# Patient Record
Sex: MALE | Race: WHITE | NOT HISPANIC OR LATINO | Employment: OTHER | URBAN - METROPOLITAN AREA
[De-identification: names, ages, dates, MRNs, and addresses within clinical notes are randomized per-mention and may not be internally consistent; named-entity substitution may affect disease eponyms.]

---

## 2017-10-22 ENCOUNTER — HOSPITAL ENCOUNTER (EMERGENCY)
Facility: OTHER | Age: 52
Discharge: HOME | End: 2017-10-22
Attending: EMERGENCY MEDICINE

## 2017-10-22 VITALS
RESPIRATION RATE: 18 BRPM | OXYGEN SATURATION: 92 % | WEIGHT: 190 LBS | TEMPERATURE: 98 F | SYSTOLIC BLOOD PRESSURE: 133 MMHG | HEIGHT: 70 IN | HEART RATE: 98 BPM | DIASTOLIC BLOOD PRESSURE: 97 MMHG | BODY MASS INDEX: 27.2 KG/M2

## 2017-10-22 DIAGNOSIS — J06.9 VIRAL URI WITH COUGH: Primary | ICD-10-CM

## 2017-10-22 DIAGNOSIS — R05.9 COUGH: ICD-10-CM

## 2017-10-22 DIAGNOSIS — R03.0 ELEVATED BLOOD PRESSURE READING: ICD-10-CM

## 2017-10-22 PROCEDURE — 25000242 PHARM REV CODE 250 ALT 637 W/ HCPCS: Performed by: PHYSICIAN ASSISTANT

## 2017-10-22 PROCEDURE — 94640 AIRWAY INHALATION TREATMENT: CPT

## 2017-10-22 PROCEDURE — 99284 EMERGENCY DEPT VISIT MOD MDM: CPT | Mod: 25

## 2017-10-22 PROCEDURE — 25000242 PHARM REV CODE 250 ALT 637 W/ HCPCS: Performed by: EMERGENCY MEDICINE

## 2017-10-22 PROCEDURE — 94761 N-INVAS EAR/PLS OXIMETRY MLT: CPT

## 2017-10-22 RX ORDER — IPRATROPIUM BROMIDE AND ALBUTEROL SULFATE 2.5; .5 MG/3ML; MG/3ML
3 SOLUTION RESPIRATORY (INHALATION)
Status: COMPLETED | OUTPATIENT
Start: 2017-10-22 | End: 2017-10-22

## 2017-10-22 RX ORDER — DEXTROAMPHETAMINE SACCHARATE, AMPHETAMINE ASPARTATE MONOHYDRATE, DEXTROAMPHETAMINE SULFATE AND AMPHETAMINE SULFATE 7.5; 7.5; 7.5; 7.5 MG/1; MG/1; MG/1; MG/1
30 CAPSULE, EXTENDED RELEASE ORAL EVERY MORNING
COMMUNITY

## 2017-10-22 RX ORDER — ALBUTEROL SULFATE 90 UG/1
1-2 AEROSOL, METERED RESPIRATORY (INHALATION) EVERY 6 HOURS PRN
Qty: 1 INHALER | Refills: 0 | Status: SHIPPED | OUTPATIENT
Start: 2017-10-22 | End: 2018-10-22

## 2017-10-22 RX ORDER — CLONAZEPAM 1 MG/1
2 TABLET ORAL NIGHTLY PRN
COMMUNITY

## 2017-10-22 RX ORDER — ZOLPIDEM TARTRATE 10 MG/1
5 TABLET ORAL NIGHTLY PRN
COMMUNITY

## 2017-10-22 RX ORDER — BUPROPION HYDROCHLORIDE 300 MG/1
300 TABLET ORAL DAILY
COMMUNITY

## 2017-10-22 RX ORDER — BENZONATATE 100 MG/1
100 CAPSULE ORAL 3 TIMES DAILY PRN
Qty: 20 CAPSULE | Refills: 0 | Status: SHIPPED | OUTPATIENT
Start: 2017-10-22 | End: 2017-11-01

## 2017-10-22 RX ADMIN — IPRATROPIUM BROMIDE AND ALBUTEROL SULFATE 3 ML: .5; 3 SOLUTION RESPIRATORY (INHALATION) at 04:10

## 2017-10-22 RX ADMIN — IPRATROPIUM BROMIDE AND ALBUTEROL SULFATE 3 ML: .5; 3 SOLUTION RESPIRATORY (INHALATION) at 03:10
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
## 2017-10-22 NOTE — ED PROVIDER NOTES
Encounter Date: 10/22/2017       History     Chief Complaint   Patient presents with    Cough     pt with cough and bronchitis x several days . pt on z pack and predinisone x 5 days.     Patient is a 51-year-old female with no significant medical history who presents to the emergency department with cough.  Patient states over the last week he has had cough with no sputum production.  He states he was running fevers and had body aches.  He states he saw his PCP who treated him with a Z-Roshan and prednisone.  Patient states over the last 5 days his symptoms have progressively worsened.  He states the cough has worsened.  He denies sputum production still.  He denies chest pain or shortness of breath.  He denies lower extremity edema.  He does report history of smoking, but states he has never been diagnosed with COPD.  States he feels himself wheezing.      The history is provided by the patient.     Review of patient's allergies indicates:  No Known Allergies  Past Medical History:   Diagnosis Date    Anxiety      Past Surgical History:   Procedure Laterality Date    TONSILLECTOMY       History reviewed. No pertinent family history.  Social History   Substance Use Topics    Smoking status: Current Every Day Smoker     Packs/day: 1.00     Types: Cigarettes    Smokeless tobacco: Never Used    Alcohol use No     Review of Systems   Constitutional: Negative for activity change, appetite change, fatigue and fever.   HENT: Negative for congestion, ear discharge, ear pain, postnasal drip, rhinorrhea, sore throat and trouble swallowing.    Respiratory: Positive for cough and wheezing. Negative for shortness of breath.    Cardiovascular: Negative for chest pain, palpitations and leg swelling.   Gastrointestinal: Negative for abdominal pain, blood in stool, constipation, diarrhea, nausea and vomiting.   Genitourinary: Negative for dysuria, flank pain and hematuria.   Musculoskeletal: Negative for back pain, neck pain and  neck stiffness.   Skin: Negative for rash and wound.   Neurological: Negative for dizziness, syncope, speech difficulty, weakness, light-headedness, numbness and headaches.       Physical Exam     Initial Vitals [10/22/17 1409]   BP Pulse Resp Temp SpO2   (!) 172/103 60 18 97.9 °F (36.6 °C) 96 %      MAP       126         Physical Exam    Nursing note and vitals reviewed.  Constitutional: He appears well-developed and well-nourished. He is not diaphoretic.  Non-toxic appearance. No distress.   HENT:   Head: Normocephalic.   Right Ear: Hearing and external ear normal.   Left Ear: Hearing and external ear normal.   Nose: Nose normal.   Mouth/Throat: Uvula is midline, oropharynx is clear and moist and mucous membranes are normal. No trismus in the jaw. No uvula swelling. No oropharyngeal exudate.   Eyes: Conjunctivae are normal. Pupils are equal, round, and reactive to light.   Neck: Normal range of motion.   Cardiovascular: Normal rate and regular rhythm.   No lower extremity edema   Pulmonary/Chest: He has wheezes (scant expiratory wheezing in lower lung fields). He has no rhonchi. He has no rales. He exhibits no tenderness.   Abdominal: Soft. Bowel sounds are normal. He exhibits no distension and no mass. There is no tenderness. There is no rebound and no guarding.   Lymphadenopathy:     He has no cervical adenopathy.   Neurological: He is alert and oriented to person, place, and time.   Skin: Skin is warm and dry. Capillary refill takes less than 2 seconds.   Psychiatric: He has a normal mood and affect.         ED Course   Procedures  Labs Reviewed - No data to display           Imaging Results          X-Ray Chest PA And Lateral (Final result)  Result time 10/22/17 15:24:07    Final result by Raven Alfredo MD (10/22/17 15:24:07)                 Impression:     No acute cardiopulmonary process.          Electronically signed by: RAVEN ALFREDO MD  Date:     10/22/17  Time:    15:24              Narrative:     Chest PA and lateral    Indication:Cough    Comparison:None    Findings:  The cardiomediastinal silhouette is not enlarged.  There is no pleural effusion.  The trachea is midline.  The lungs are symmetrically expanded bilaterally without evidence of acute parenchymal process. No large focal consolidation seen.  There is no pneumothorax.  The osseous structures are remarkable for degenerative changes.                              Medical Decision Making:   Initial Assessment:   Urgent evaluation of a 51-year-old male with no significant medical history who presents to the emergency department with cough.  Patient is afebrile, nontoxic appearing, and hemodynamically stable.  Oxygen is 96% on room air.  No lower extremity edema.  On lung auscultation, there are scant exit toward he wheezes in the lower lung fields.  I suspect patient has viral URI.  With no shortness of breath, I doubt congestive heart failure, PE or ACS.  Chest x-ray will be obtained.  Patient will be given breathing treatments and reevaluated.  Clinical Tests:   Radiological Study: Ordered and Reviewed  ED Management:  3:33 PM  Chest x-ray reveals no acute cardiopulmonary process.  Patient be discharged with albuterol for wheezing.  He is counseled on importance of smoking cessation.  He will be given Tessalon for cough.  He is advised to follow-up with PCP in 24-48 hours for reevaluation.  He is advised to keep blood pressure journal to present to PCP.  He is advised to return to the emergency department with any worsening symptoms or concerns.    3:42 PM  Delayed discharge. Pt is satting at 92% on room air.  Will administer another breathing treatment.    5:11 PM  Patient has had 4 more breathing treatments.  He states he feels much better.  Patient is still slightly hypoxic at 94% on room air.  I gave patient option of being admitted to hospital.  He states he does not want a hospital bill.  He states he would rather follow-up outpatient.  I  suspect patient has developed COPD.  He is encouraged to stop smoking.  He will be discharged with Tessalon and albuterol.  He is currently on steroids and Z-Roshan from PCP.  He is given contact information to establish care with a PCP here in the area.  He is urged to return to the emergency department with any worsening symptoms.  Other:   I have discussed this case with another health care provider.       <> Summary of the Discussion: Dr. Slater                   ED Course      Clinical Impression:   The primary encounter diagnosis was Viral URI with cough. Diagnoses of Cough and Elevated blood pressure reading were also pertinent to this visit.                           Nathalia Bunch PA-C  10/22/17 1536       Nathalia Bunch PA-C  10/22/17 1542       Nathalia Bunch PA-C  10/22/17 1718

## 2017-10-22 NOTE — ED NOTES
Patient Identifiers for Justin Hickman checked and correct  LOC: The patient is awake, alert and aware of environment with an appropriate affect, the patient is oriented x 3 and speaking appropriate.  APPEARANCE: Patient resting comfortably and in no acute distress. The patient is clean and well groomed. The patient's clothing is properly fastened.  SKIN: The skin is warm and dry. The patient has normal skin turgor and moist mucus membranes. No rashes or lesions upon observation. Skin Intact , no breakdown noted.  Musculoskeletal :  Normal range of motion noted. Moves all extremities well, No swelling. Palpation tenderness noted in the mid thoracic spine & right side of neck.  RESPIRATORY: Airway is open and patent, respirations are spontaneous, patient has a normal effort and rate. Coarse inspiratory rales & wheezing auscultated in the left base, diffusely auscultated all other fields.  CARDIAC: Patient has a normal rate and rhythm, no peripheral edema noted, capillary refill < 3 seconds.   PULSES: 2+ radial pulses, symmetrical.  NEUROLOGIC: PERRL, Pupils 3 mm and reacts briskly to light. Motor strength 5/5 all extremities.  The pt's facial expression is symmetrical, patient moving all extremities, normal sensation in all extremities when touched with a finger.The patient is awake, alert and cooperative with a calm affect, patient is aware of environment.      Will continue to monitor

## 2019-10-29 ENCOUNTER — TELEPHONE (OUTPATIENT)
Dept: TRANSPLANT | Facility: CLINIC | Age: 54
End: 2019-10-29

## 2019-10-29 NOTE — TELEPHONE ENCOUNTER
----- Message from Joshua Houston sent at 10/29/2019  5:17 PM CDT -----    We have the pt records and they are now pending review by the referral nurse.  By:Joshua Houston

## 2019-11-01 ENCOUNTER — DOCUMENTATION ONLY (OUTPATIENT)
Dept: TRANSPLANT | Facility: CLINIC | Age: 54
End: 2019-11-01

## 2019-11-01 NOTE — PROGRESS NOTES
Pt records reviewed.  Pt will be referred to [facility] due to HEP C   Initial referral received from DR. Haider Simpson   Referral letter sent to provider and patient.

## 2019-11-01 NOTE — LETTER
November 1, 2019    Haider Simpson, JOSHUA  [address]  [address]  [place] [address]      Dear Dr. Simpson    Patient: Justin Hickman   MR Number: [id number]   YOB: 1965     Thank you for the referral of Justin Hickman to the [facility] program. An initial appointment will be scheduled for your patient with one of our Hepatologists.      Thank you again for your trust in our program.  If there is anything we can do for you or your staff, please feel free to contact us.        Sincerely,        [facility]  [address] [place] [address]  [phone]

## 2019-11-01 NOTE — LETTER
November 1, 2019    Justin Hickman  [address] [address]      Dear Justin Hickman:    Your doctor has referred you to the [facility]. We are sending this letter to remind you to make an appointment with us to complete the referral process.     Please call us at [phone] to schedule an appointment. We look forward to seeing you soon.     If you received a call and have been scheduled, please disregard this letter.       Sincerely,        [facility]   [address] [place] [address]  [phone]

## 2020-03-24 ENCOUNTER — OFFICE VISIT (OUTPATIENT)
Dept: URGENT CARE | Facility: CLINIC | Age: 55
End: 2020-03-24
Payer: MEDICAID

## 2020-03-24 VITALS
DIASTOLIC BLOOD PRESSURE: 98 MMHG | HEART RATE: 53 BPM | HEIGHT: 70 IN | WEIGHT: 230 LBS | RESPIRATION RATE: 18 BRPM | SYSTOLIC BLOOD PRESSURE: 153 MMHG | BODY MASS INDEX: 32.93 KG/M2 | OXYGEN SATURATION: 97 % | TEMPERATURE: 97 F

## 2020-03-24 DIAGNOSIS — S61.011A LACERATION OF RIGHT THUMB WITHOUT FOREIGN BODY WITHOUT DAMAGE TO NAIL, INITIAL ENCOUNTER: Primary | ICD-10-CM

## 2020-03-24 DIAGNOSIS — S69.91XA INJURY OF RIGHT THUMB, INITIAL ENCOUNTER: ICD-10-CM

## 2020-03-24 PROCEDURE — 90471 TD VACCINE GREATER THAN OR EQUAL TO 7YO WITH PRESERVATIVE IM: ICD-10-PCS | Mod: S$GLB,,, | Performed by: NURSE PRACTITIONER

## 2020-03-24 PROCEDURE — 90714 TD VACC NO PRESV 7 YRS+ IM: CPT | Mod: S$GLB,,, | Performed by: NURSE PRACTITIONER

## 2020-03-24 PROCEDURE — 99203 PR OFFICE/OUTPT VISIT, NEW, LEVL III, 30-44 MIN: ICD-10-PCS | Mod: 25,S$GLB,, | Performed by: NURSE PRACTITIONER

## 2020-03-24 PROCEDURE — 90471 IMMUNIZATION ADMIN: CPT | Mod: S$GLB,,, | Performed by: NURSE PRACTITIONER

## 2020-03-24 PROCEDURE — 90714 TD VACCINE GREATER THAN OR EQUAL TO 7YO WITH PRESERVATIVE IM: ICD-10-PCS | Mod: S$GLB,,, | Performed by: NURSE PRACTITIONER

## 2020-03-24 PROCEDURE — 12041 INTMD RPR N-HF/GENIT 2.5CM/<: CPT | Mod: S$GLB,,, | Performed by: FAMILY MEDICINE

## 2020-03-24 PROCEDURE — 73140 XR FINGER 2 OR MORE VIEWS: ICD-10-PCS | Mod: FY,RT,S$GLB, | Performed by: RADIOLOGY

## 2020-03-24 PROCEDURE — 73140 X-RAY EXAM OF FINGER(S): CPT | Mod: FY,RT,S$GLB, | Performed by: RADIOLOGY

## 2020-03-24 PROCEDURE — 12041 LACERATION REPAIR: ICD-10-PCS | Mod: S$GLB,,, | Performed by: FAMILY MEDICINE

## 2020-03-24 PROCEDURE — 99203 OFFICE O/P NEW LOW 30 MIN: CPT | Mod: 25,S$GLB,, | Performed by: NURSE PRACTITIONER

## 2020-03-24 RX ORDER — DEXTROAMPHETAMINE SACCHARATE, AMPHETAMINE ASPARTATE, DEXTROAMPHETAMINE SULFATE AND AMPHETAMINE SULFATE 7.5; 7.5; 7.5; 7.5 MG/1; MG/1; MG/1; MG/1
30 TABLET ORAL
COMMUNITY
Start: 2019-11-25

## 2020-03-24 RX ORDER — CLONAZEPAM 2 MG/1
TABLET ORAL
COMMUNITY
Start: 2019-11-04

## 2020-03-24 RX ORDER — LITHIUM CARBONATE 450 MG/1
450 TABLET ORAL
COMMUNITY
Start: 2019-07-12

## 2020-03-24 RX ORDER — BUPRENORPHINE AND NALOXONE 8; 2 MG/1; MG/1
2 FILM, SOLUBLE BUCCAL; SUBLINGUAL
COMMUNITY
Start: 2018-08-07

## 2020-03-24 RX ORDER — LEVOTHYROXINE SODIUM 75 UG/1
75 TABLET ORAL
COMMUNITY
Start: 2020-01-17 | End: 2020-07-17

## 2020-03-24 RX ORDER — TRAZODONE HYDROCHLORIDE 100 MG/1
TABLET ORAL
COMMUNITY
Start: 2019-10-28

## 2020-03-25 NOTE — PROCEDURES
"Laceration Repair  Date/Time: 3/24/2020 7:05 PM  Performed by: Houston Evans MD  Authorized by: Sindy Lala NP   Consent Done: Yes  Consent: Verbal consent obtained.  Consent given by: patient  Patient identity confirmed:  and name  Time out: Immediately prior to procedure a "time out" was called to verify the correct patient, procedure, equipment, support staff and site/side marked as required.  Body area: upper extremity  Location details: right thumb  Laceration length: 1.5 cm  Foreign bodies: no foreign bodies  Vascular damage: no  Anesthesia: local infiltration    Anesthesia:  Local Anesthetic: lidocaine 1% without epinephrine  Preparation: Patient was prepped and draped in the usual sterile fashion.  Irrigation solution: saline  Irrigation method: syringe  Amount of cleaning: extensive  Debridement: none  Degree of undermining: none  Skin closure: Ethilon (4)  Number of sutures: 4  Technique: simple  Approximation: close  Approximation difficulty: complex  Dressing: dressing applied  Patient tolerance: Patient tolerated the procedure well with no immediate complications        "

## 2020-03-25 NOTE — PATIENT INSTRUCTIONS
PLEASE READ YOUR DISCHARGE INSTRUCTIONS ENTIRELY AS IT CONTAINS IMPORTANT INFORMATION.    Use the antibiotic ointment for 5 days then stop and allow to scab over    Do not wet laceration for 12 hours.  Do not submerge laceration in water.  Gently clean wound with soap and water at least twice daily unless more frequently soiled then clean more frequently.  May apply Bactroban to wound to promote healing.  Must clean old antibiotic ointment away before new application.  DO NOT REMOVE SUTURES YOURSELF.  Return to clinic for suture removal as directed.   Signs of infection:  Increase in redness, increase in pain, purulent (pus) drainage. Contact clinic if infection concerns arise. Do not apply a great deal of tension or stress to the suture line.    Return in 7-10 days to have the sutures removed.     Please return or see your primary care doctor if you develop new or worsening symptoms (fever, spreading redness, pus drainage, severe pain or swelling).     Please arrange follow up with your primary medical clinic as soon as possible. You must understand that you've received an Urgent Care treatment only and that you may be released before all of your medical problems are known or treated. You, the patient, will arrange for follow up as instructed. If your symptoms worsen or fail to improve you should go to the Emergency Room.  Extremity Laceration: Sutures, Staples, or Tape  A laceration is a cut through the skin. If it is deep, it may require stitches (sutures) or staples to close so it can heal. Minor cuts may be treated with surgical tape closures.   X-rays may be done if something may have entered the skin through the cut. You may also need a tetanus shot if you are not up to date on this vaccination.  Home care  · Follow the health care providers instructions on how to care for the cut.  · Wash your hands with soap and warm water before and after caring for your wound. This is to help prevent infection.  · Keep  the wound clean and dry. If a bandage was applied and it becomes wet or dirty, replace it. Otherwise, leave it in place for the first 24 hours, then change it once a day or as directed.  · If sutures or staples were used, clean the wound daily:  · After removing the bandage, wash the area with soap and water. Use a wet cotton swab to loosen and remove any blood or crust that forms.  · After cleaning, keep the wound clean and dry. Talk with your doctor before applying any antibiotic ointment to the wound. Reapply the bandage.  · You may remove the bandage to shower as usual after the first 24 hours, but do not soak the area in water (no swimming) until the stitches or staples are removed.  · If surgical tape closures were used, keep the area clean and dry. If it becomes wet, blot it dry with a towel.  · The doctor may prescribe an antibiotic cream or ointment to prevent infection. Do not stop taking this medication until you have finished the prescribed course or the doctor tells you to stop. The doctor may also prescribe medications for pain. Follow the doctors instructions for taking these medications.  · Avoid activities that may reopen your wound.  Follow-up care  Follow up with your health care provider. Most skin wounds heal within ten days. However, an infection may sometimes occur despite proper treatment. Therefore, check the wound daily for the signs of infection listed below. Stitches and staples should be removed within 7-14 days. If surgical tape closures were used, you may remove them after 10 days if they have not fallen off by then.   When to seek medical advice  Call your health care provider right away if any of these occur:  · Wound bleeding not controlled by direct pressure  · Signs of infection, including increasing pain in the wound, increasing wound redness or swelling, or pus or bad odor coming from the wound  · Fever of 100.4°F (38ºC) or higher or as directed by your healthcare  provider  · Stitches or staples come apart or fall out or surgical tape falls off before 7 days  · Wound edges re-open  · Wound changes colors  · Numbness around the wound   · Decreased movement around the injured area  Date Last Reviewed: 6/14/2015  © 7781-6957 [employer]. [address] [place] [address]. All rights reserved. This information is not intended as a substitute for professional medical care. Always follow your healthcare professional's instructions.

## 2020-03-25 NOTE — PROGRESS NOTES
"Subjective:       Patient ID: Justin Hickman is a 54 y.o. male.    Vitals:  height is 5' 10" (1.778 m) and weight is 104.3 kg (230 lb). His temperature is 97 °F (36.1 °C). His blood pressure is 153/98 (abnormal) and his pulse is 53 (abnormal). His respiration is 18 and oxygen saturation is 97%.     Chief Complaint: Hand Pain (Right Thumb )    Hand Pain    The incident occurred 1 to 3 hours ago. The incident occurred at work. The injury mechanism was a direct blow. The pain is present in the right fingers. Quality: Throbbing  The pain is at a severity of 5/10. The pain is moderate. The pain has been fluctuating since the incident. Associated symptoms include numbness and tingling. The symptoms are aggravated by movement and palpation. Treatments tried: Peroxide and Antibiotic Ointment  The treatment provided moderate relief.       Constitution: Negative for fatigue.   HENT: Negative for facial swelling and facial trauma.    Neck: Negative for neck stiffness.   Cardiovascular: Negative for chest trauma.   Eyes: Negative for eye trauma, double vision and blurred vision.   Gastrointestinal: Negative for abdominal trauma, abdominal pain and rectal bleeding.   Genitourinary: Negative for hematuria, genital trauma and pelvic pain.   Musculoskeletal: Positive for trauma and joint swelling. Negative for pain, abnormal ROM of joint and pain with walking.   Skin: Positive for laceration. Negative for color change, wound and abrasion.   Neurological: Positive for numbness. Negative for dizziness, history of vertigo, light-headedness, coordination disturbances, altered mental status and loss of consciousness.   Hematologic/Lymphatic: Negative for history of bleeding disorder.   Psychiatric/Behavioral: Negative for altered mental status.       Objective:      Physical Exam   Constitutional: He is oriented to person, place, and time. He appears well-developed and well-nourished. He is cooperative.  Non-toxic " appearance. He does not appear ill. No distress.   HENT:   Head: Normocephalic and atraumatic. Head is without abrasion, without contusion and without laceration.   Right Ear: Hearing, tympanic membrane, external ear and ear canal normal. No hemotympanum.   Left Ear: Hearing, tympanic membrane, external ear and ear canal normal. No hemotympanum.   Nose: Nose normal. No mucosal edema, rhinorrhea or nasal deformity. No epistaxis. Right sinus exhibits no maxillary sinus tenderness and no frontal sinus tenderness. Left sinus exhibits no maxillary sinus tenderness and no frontal sinus tenderness.   Mouth/Throat: Uvula is midline, oropharynx is clear and moist and mucous membranes are normal. No trismus in the jaw. Normal dentition. No uvula swelling. No posterior oropharyngeal erythema.   Eyes: Pupils are equal, round, and reactive to light. Conjunctivae, EOM and lids are normal. Right eye exhibits no discharge. Left eye exhibits no discharge. No scleral icterus.   Neck: Trachea normal, normal range of motion, full passive range of motion without pain and phonation normal. Neck supple. No spinous process tenderness and no muscular tenderness present. No neck rigidity. No tracheal deviation present.   Cardiovascular: Normal rate, regular rhythm, normal heart sounds, intact distal pulses and normal pulses.   Pulmonary/Chest: Effort normal and breath sounds normal. No respiratory distress.   Abdominal: Soft. Normal appearance and bowel sounds are normal. He exhibits no distension, no pulsatile midline mass and no mass. There is no tenderness.   Musculoskeletal: Normal range of motion. He exhibits no edema or deformity.        Hands:  Neurological: He is alert and oriented to person, place, and time. He has normal strength. No cranial nerve deficit or sensory deficit. He exhibits normal muscle tone. He displays no seizure activity. Coordination normal. GCS eye subscore is 4. GCS verbal subscore is 5. GCS motor subscore is 6.    Skin: Skin is warm, dry, intact, not diaphoretic and not pale. Capillary refill takes less than 2 seconds. abrasion, burn, bruising and ecchymosis  Psychiatric: He has a normal mood and affect. His speech is normal and behavior is normal. Judgment and thought content normal. Cognition and memory are normal.   Nursing note and vitals reviewed.      X-ray Finger 2 Or More Views    Result Date: 3/24/2020  EXAMINATION: XR FINGER 2 OR MORE VIEWS CLINICAL HISTORY: Unspecified injury of right wrist, hand and finger(s), initial encounter TECHNIQUE: Three views right thumb COMPARISON: None FINDINGS: Skin irregularity about the tip of the thumb likely representing soft tissue laceration.  No radiodense retained foreign body. Bones are well mineralized.  Overall alignment is within normal limits.  No displaced fracture, dislocation or destructive osseous process.     Distal right thumb suspected soft tissue laceration without acute osseous process or radiodense retained foreign body identified.  Correlate for depth of laceration. Electronically signed by: Eusebio Gonzales MD Date:    03/24/2020 Time:    19:37      Discussed results/diagnosis/plan with patient in clinic.   Strict precautions given to patient to monitor for worsening signs and symptoms. Advised to follow up with primary.All questions answered. Strict ER precautions given. If your symptoms worsens of fail to improve you should go to the Emergency Room. Patient voiced understanding and in agreement with current treatment plan.          Assessment:       1. Laceration of right thumb without foreign body without damage to nail, initial encounter    2. Injury of right thumb, initial encounter        Plan:         Laceration of right thumb without foreign body without damage to nail, initial encounter  -     X-Ray Finger 2 or More Views; Future; Expected date: 03/24/2020  -     (In Office Administered) Td Vaccine  -     Laceration Repair    Injury of right thumb,  initial encounter  -     X-Ray Finger 2 or More Views; Future; Expected date: 03/24/2020  -     (In Office Administered) Td Vaccine  -     Laceration Repair      Patient Instructions   PLEASE READ YOUR DISCHARGE INSTRUCTIONS ENTIRELY AS IT CONTAINS IMPORTANT INFORMATION.    Use the antibiotic ointment for 5 days then stop and allow to scab over    Do not wet laceration for 12 hours.  Do not submerge laceration in water.  Gently clean wound with soap and water at least twice daily unless more frequently soiled then clean more frequently.  May apply Bactroban to wound to promote healing.  Must clean old antibiotic ointment away before new application.  DO NOT REMOVE SUTURES YOURSELF.  Return to clinic for suture removal as directed.   Signs of infection:  Increase in redness, increase in pain, purulent (pus) drainage. Contact clinic if infection concerns arise. Do not apply a great deal of tension or stress to the suture line.    Return in 7-10 days to have the sutures removed.     Please return or see your primary care doctor if you develop new or worsening symptoms (fever, spreading redness, pus drainage, severe pain or swelling).     Please arrange follow up with your primary medical clinic as soon as possible. You must understand that you've received an Urgent Care treatment only and that you may be released before all of your medical problems are known or treated. You, the patient, will arrange for follow up as instructed. If your symptoms worsen or fail to improve you should go to the Emergency Room.  Extremity Laceration: Sutures, Staples, or Tape  A laceration is a cut through the skin. If it is deep, it may require stitches (sutures) or staples to close so it can heal. Minor cuts may be treated with surgical tape closures.   X-rays may be done if something may have entered the skin through the cut. You may also need a tetanus shot if you are not up to date on this vaccination.  Home care  · Follow the health  care providers instructions on how to care for the cut.  · Wash your hands with soap and warm water before and after caring for your wound. This is to help prevent infection.  · Keep the wound clean and dry. If a bandage was applied and it becomes wet or dirty, replace it. Otherwise, leave it in place for the first 24 hours, then change it once a day or as directed.  · If sutures or staples were used, clean the wound daily:  · After removing the bandage, wash the area with soap and water. Use a wet cotton swab to loosen and remove any blood or crust that forms.  · After cleaning, keep the wound clean and dry. Talk with your doctor before applying any antibiotic ointment to the wound. Reapply the bandage.  · You may remove the bandage to shower as usual after the first 24 hours, but do not soak the area in water (no swimming) until the stitches or staples are removed.  · If surgical tape closures were used, keep the area clean and dry. If it becomes wet, blot it dry with a towel.  · The doctor may prescribe an antibiotic cream or ointment to prevent infection. Do not stop taking this medication until you have finished the prescribed course or the doctor tells you to stop. The doctor may also prescribe medications for pain. Follow the doctors instructions for taking these medications.  · Avoid activities that may reopen your wound.  Follow-up care  Follow up with your health care provider. Most skin wounds heal within ten days. However, an infection may sometimes occur despite proper treatment. Therefore, check the wound daily for the signs of infection listed below. Stitches and staples should be removed within 7-14 days. If surgical tape closures were used, you may remove them after 10 days if they have not fallen off by then.   When to seek medical advice  Call your health care provider right away if any of these occur:  · Wound bleeding not controlled by direct pressure  · Signs of infection, including  increasing pain in the wound, increasing wound redness or swelling, or pus or bad odor coming from the wound  · Fever of 100.4°F (38ºC) or higher or as directed by your healthcare provider  · Stitches or staples come apart or fall out or surgical tape falls off before 7 days  · Wound edges re-open  · Wound changes colors  · Numbness around the wound   · Decreased movement around the injured area  Date Last Reviewed: 6/14/2015 © 2000-2017 [employer]. [address] [place] [address]. All rights reserved. This information is not intended as a substitute for professional medical care. Always follow your healthcare professional's instructions.

## 2020-04-23 ENCOUNTER — OFFICE VISIT (OUTPATIENT)
Dept: URGENT CARE | Facility: CLINIC | Age: 55
End: 2020-04-23
Payer: MEDICAID

## 2020-04-23 VITALS
TEMPERATURE: 98 F | RESPIRATION RATE: 16 BRPM | HEIGHT: 70 IN | HEART RATE: 77 BPM | WEIGHT: 230 LBS | OXYGEN SATURATION: 96 % | BODY MASS INDEX: 32.93 KG/M2

## 2020-04-23 DIAGNOSIS — L03.113 CELLULITIS OF RIGHT UPPER EXTREMITY: Primary | ICD-10-CM

## 2020-04-23 PROCEDURE — 99213 OFFICE O/P EST LOW 20 MIN: CPT | Mod: S$GLB,,, | Performed by: NURSE PRACTITIONER

## 2020-04-23 PROCEDURE — 99213 PR OFFICE/OUTPT VISIT, EST, LEVL III, 20-29 MIN: ICD-10-PCS | Mod: S$GLB,,, | Performed by: NURSE PRACTITIONER

## 2020-04-23 RX ORDER — DOXYCYCLINE 100 MG/1
100 CAPSULE ORAL 2 TIMES DAILY
Qty: 20 CAPSULE | Refills: 0 | Status: SHIPPED | OUTPATIENT
Start: 2020-04-23 | End: 2020-05-03

## 2020-04-23 NOTE — PROGRESS NOTES
"Subjective:       Patient ID: Justin Hickman is a 54 y.o. male.    Vitals:  height is 5' 10" (1.778 m) and weight is 104.3 kg (230 lb). His tympanic temperature is 98 °F (36.7 °C). His pulse is 77. His respiration is 16 and oxygen saturation is 96%.     Chief Complaint: Hand Problem (R THUMB)    Ambulatory with chief complaint of swelling and mild redness to right thumb area.  Patient was here on March 24th for a laceration repair.  Patient states his entire pad of his thumb was opened by a strap.  patient states wound healed well and had no issues until he started playing golf again.     Hand Pain    The incident occurred more than 1 week ago (X1 MONTH PRIOR). The incident occurred at home. There was no injury mechanism. The pain is present in the right fingers (R THUMB). The quality of the pain is described as aching. The pain does not radiate. The pain is at a severity of 0/10. The patient is experiencing no pain. Pertinent negatives include no chest pain.       Constitution: Negative for chills, fatigue and fever.   HENT: Negative for congestion and sore throat.    Neck: Negative for painful lymph nodes.   Cardiovascular: Negative for chest pain and leg swelling.   Eyes: Negative for double vision and blurred vision.   Respiratory: Negative for cough and shortness of breath.    Gastrointestinal: Negative for nausea, vomiting and diarrhea.   Genitourinary: Negative for dysuria, frequency and urgency.   Musculoskeletal: Positive for pain and joint swelling. Negative for joint pain, muscle cramps and muscle ache.        R THUMB   Skin: Negative for color change, pale and rash.   Allergic/Immunologic: Negative for seasonal allergies.   Neurological: Negative for dizziness, history of vertigo, light-headedness, passing out and headaches.   Hematologic/Lymphatic: Negative for swollen lymph nodes, easy bruising/bleeding and history of blood clots. Does not bruise/bleed easily.   Psychiatric/Behavioral: " Negative for nervous/anxious, sleep disturbance and depression. The patient is not nervous/anxious.        Objective:      Physical Exam   Constitutional: He is oriented to person, place, and time. He appears well-developed and well-nourished. He is cooperative.  Non-toxic appearance. He does not appear ill. No distress.   HENT:   Head: Normocephalic and atraumatic.   Right Ear: Hearing, tympanic membrane, external ear and ear canal normal.   Left Ear: Hearing, tympanic membrane, external ear and ear canal normal.   Nose: Nose normal. No mucosal edema, rhinorrhea or nasal deformity. No epistaxis. Right sinus exhibits no maxillary sinus tenderness and no frontal sinus tenderness. Left sinus exhibits no maxillary sinus tenderness and no frontal sinus tenderness.   Mouth/Throat: Uvula is midline, oropharynx is clear and moist and mucous membranes are normal. No trismus in the jaw. Normal dentition. No uvula swelling. No posterior oropharyngeal erythema.   Eyes: Conjunctivae and lids are normal. Right eye exhibits no discharge. Left eye exhibits no discharge. No scleral icterus.   Neck: Trachea normal, normal range of motion, full passive range of motion without pain and phonation normal. Neck supple.   Cardiovascular: Normal rate, regular rhythm, normal heart sounds, intact distal pulses and normal pulses.   Pulmonary/Chest: Effort normal and breath sounds normal. No respiratory distress.   Abdominal: Soft. Normal appearance and bowel sounds are normal. He exhibits no distension, no pulsatile midline mass and no mass. There is no tenderness.   Musculoskeletal: Normal range of motion. He exhibits no edema or deformity.        Hands:  Neurological: He is alert and oriented to person, place, and time. He exhibits normal muscle tone. Coordination normal.   Skin: Skin is warm, dry, intact, not diaphoretic and not pale.   Psychiatric: He has a normal mood and affect. His speech is normal and behavior is normal. Judgment and  thought content normal. Cognition and memory are normal.   Nursing note and vitals reviewed.      Concern for possible infection. Will cover with antibiotics and have follow up for referral to hand specialist is not improving in 5 days.  Assessment:       1. Cellulitis of right upper extremity        Plan:         Cellulitis of right upper extremity      Patient Instructions     Warm soaks elevate. . Keep clean and dry. Return to clinic in five days if not improving. Take antibiotics until gone.     Please return to clinic or go to the Emergency department for any concerns or worsening of your symptoms. If you are not improving after treatment please follow up with your primary care md for further evaluation.  If you were prescribed antibiotics please take them to completion. If you were prescribed a narcotic medication, do not drive or operate heavy machinery while taking these medications.   Discharge Instructions for Cellulitis  You have been diagnosed with cellulitis. This is an infection in the deepest layer of the skin. In some cases, the infection also affects the muscle. Cellulitis is caused by bacteria. The bacteria can enter the body through broken skin. This can happen with a cut, scratch, animal bite, or an insect bite that has been scratched. You may have been treated in the hospital with antibiotics and fluids. You will likely be given a prescription for antibiotics to take at home. This sheet will help you take care of yourself at home.  Home care  When you are home:  · Take the prescribed antibiotic medicine you are given as directed until it is gone. Take it even if you feel better. It treats the infection and stops it from returning. Not taking all the medicine can make future infections hard to treat.  · Keep the infected area clean.  · When possible, raise the infected area above the level of your heart. This helps keep swelling down.  · Talk with your healthcare provider if you are in pain. Ask  what kind of over-the-counter medicine you can take for pain.  · Apply clean bandages as advised.  · Take your temperature once a day for a week.  · Wash your hands often to prevent spreading the infection.  In the future, wash your hands before and after you touch cuts, scratches, or bandages. This will help prevent infection.   When to call your healthcare provider  Call your healthcare provider immediately if you have any of the following:  · Difficulty or pain when moving the joints above or below the infected area  · Discharge or pus draining from the area  · Fever of 100.4°F (38°C) or higher, or as directed by your healthcare provider  · Pain that gets worse in or around the infected   · Redness that gets worse in or around the infected area, particularly if the area of redness expands to a wider area  · Shaking chills  · Swelling of the infected area  · Vomiting   Date Last Reviewed: 8/1/2016  © 2565-1335 [employer]. [address] [place] [address], [place] [address]. All rights reserved. This information is not intended as a substitute for professional medical care. Always follow your healthcare professional's instructions.

## 2020-04-23 NOTE — PATIENT INSTRUCTIONS
Warm soaks elevate. . Keep clean and dry. Return to clinic in five days if not improving. Take antibiotics until gone.     Please return to clinic or go to the Emergency department for any concerns or worsening of your symptoms. If you are not improving after treatment please follow up with your primary care md for further evaluation.  If you were prescribed antibiotics please take them to completion. If you were prescribed a narcotic medication, do not drive or operate heavy machinery while taking these medications.   Discharge Instructions for Cellulitis  You have been diagnosed with cellulitis. This is an infection in the deepest layer of the skin. In some cases, the infection also affects the muscle. Cellulitis is caused by bacteria. The bacteria can enter the body through broken skin. This can happen with a cut, scratch, animal bite, or an insect bite that has been scratched. You may have been treated in the hospital with antibiotics and fluids. You will likely be given a prescription for antibiotics to take at home. This sheet will help you take care of yourself at home.  Home care  When you are home:  · Take the prescribed antibiotic medicine you are given as directed until it is gone. Take it even if you feel better. It treats the infection and stops it from returning. Not taking all the medicine can make future infections hard to treat.  · Keep the infected area clean.  · When possible, raise the infected area above the level of your heart. This helps keep swelling down.  · Talk with your healthcare provider if you are in pain. Ask what kind of over-the-counter medicine you can take for pain.  · Apply clean bandages as advised.  · Take your temperature once a day for a week.  · Wash your hands often to prevent spreading the infection.  In the future, wash your hands before and after you touch cuts, scratches, or bandages. This will help prevent infection.   When to call your healthcare provider  Call your  healthcare provider immediately if you have any of the following:  · Difficulty or pain when moving the joints above or below the infected area  · Discharge or pus draining from the area  · Fever of 100.4°F (38°C) or higher, or as directed by your healthcare provider  · Pain that gets worse in or around the infected   · Redness that gets worse in or around the infected area, particularly if the area of redness expands to a wider area  · Shaking chills  · Swelling of the infected area  · Vomiting   Date Last Reviewed: 8/1/2016 © 2000-2017 [employer]. [address] [place] [address]. All rights reserved. This information is not intended as a substitute for professional medical care. Always follow your healthcare professional's instructions.

## 2020-05-02 ENCOUNTER — OFFICE VISIT (OUTPATIENT)
Dept: URGENT CARE | Facility: CLINIC | Age: 55
End: 2020-05-02
Payer: MEDICAID

## 2020-05-02 VITALS
TEMPERATURE: 97 F | BODY MASS INDEX: 32.93 KG/M2 | HEIGHT: 70 IN | HEART RATE: 75 BPM | WEIGHT: 230 LBS | RESPIRATION RATE: 16 BRPM | OXYGEN SATURATION: 96 %

## 2020-05-02 DIAGNOSIS — S60.351A FOREIGN BODY OF RIGHT THUMB, INITIAL ENCOUNTER: Primary | ICD-10-CM

## 2020-05-02 PROCEDURE — 99213 OFFICE O/P EST LOW 20 MIN: CPT | Mod: S$GLB,,, | Performed by: FAMILY MEDICINE

## 2020-05-02 PROCEDURE — 99213 PR OFFICE/OUTPT VISIT, EST, LEVL III, 20-29 MIN: ICD-10-PCS | Mod: S$GLB,,, | Performed by: FAMILY MEDICINE

## 2020-05-02 NOTE — PROGRESS NOTES
"Subjective:       Patient ID: Justin Hickman is a 54 y.o. male.    Vitals:  height is 5' 10" (1.778 m) and weight is 104.3 kg (230 lb). His tympanic temperature is 97.2 °F (36.2 °C). His pulse is 75. His respiration is 16 and oxygen saturation is 96%.     Chief Complaint: Hand Pain (R THUMB SWELLING)    Patient here with complaint of persistent right thumb pain and swelling and feeling of foreign body side thumb patient had laceration about close to 2 months ago and took off the stitches on his the laceration was secondary to a strep injury very forceful no foreign body was seen at that time    Hand Pain    The incident occurred more than 1 week ago. The incident occurred at home. There was no injury mechanism. The pain is present in the right fingers. The quality of the pain is described as aching (SWOLLEN). The pain does not radiate. The pain is at a severity of 5/10. The pain is mild. The pain has been constant since the incident. Pertinent negatives include no chest pain. The symptoms are aggravated by movement and palpation. Treatments tried: DOXYCYCLINE. The treatment provided no relief.       Constitution: Negative for chills, fatigue and fever.   HENT: Negative for congestion and sore throat.    Neck: Negative for painful lymph nodes.   Cardiovascular: Negative for chest pain and leg swelling.   Eyes: Negative for double vision and blurred vision.   Respiratory: Negative for cough and shortness of breath.    Gastrointestinal: Negative for nausea, vomiting and diarrhea.   Genitourinary: Negative for dysuria, frequency and urgency.   Musculoskeletal: Positive for pain, joint pain and joint swelling. Negative for muscle cramps and muscle ache.   Skin: Negative for color change, pale and rash.   Allergic/Immunologic: Negative for seasonal allergies.   Neurological: Negative for dizziness, history of vertigo, light-headedness, passing out and headaches.   Hematologic/Lymphatic: Negative for swollen " lymph nodes, easy bruising/bleeding and history of blood clots. Does not bruise/bleed easily.   Psychiatric/Behavioral: Negative for nervous/anxious, sleep disturbance and depression. The patient is not nervous/anxious.        Objective:      Physical Exam   Constitutional: He is oriented to person, place, and time. He appears well-developed and well-nourished. He is cooperative.  Non-toxic appearance. He does not appear ill. No distress.   HENT:   Head: Normocephalic and atraumatic.   Right Ear: Hearing, tympanic membrane, external ear and ear canal normal.   Left Ear: Hearing, tympanic membrane, external ear and ear canal normal.   Nose: Nose normal. No mucosal edema, rhinorrhea or nasal deformity. No epistaxis. Right sinus exhibits no maxillary sinus tenderness and no frontal sinus tenderness. Left sinus exhibits no maxillary sinus tenderness and no frontal sinus tenderness.   Mouth/Throat: Uvula is midline, oropharynx is clear and moist and mucous membranes are normal. No trismus in the jaw. Normal dentition. No uvula swelling. No posterior oropharyngeal erythema.   Eyes: Conjunctivae and lids are normal. Right eye exhibits no discharge. Left eye exhibits no discharge. No scleral icterus.   Neck: Trachea normal, normal range of motion, full passive range of motion without pain and phonation normal. Neck supple.   Cardiovascular: Normal rate, regular rhythm, normal heart sounds, intact distal pulses and normal pulses.   Pulmonary/Chest: Effort normal and breath sounds normal. No respiratory distress.   Abdominal: Soft. Normal appearance and bowel sounds are normal. He exhibits no distension, no pulsatile midline mass and no mass. There is no tenderness.   Musculoskeletal: Normal range of motion. He exhibits no edema or deformity.   Right thumb with mild swelling, non tender otherwise FROM  Small bump like lesions felt volar aspect of thumb   Neurological: He is alert and oriented to person, place, and time. He  exhibits normal muscle tone. Coordination normal.   Skin: Skin is warm, dry, intact, not diaphoretic and not pale.   Psychiatric: He has a normal mood and affect. His speech is normal and behavior is normal. Judgment and thought content normal. Cognition and memory are normal.   Nursing note and vitals reviewed.        Assessment:       1. Foreign body of right thumb, initial encounter        Plan:         Foreign body of right thumb, initial encounter  -     Ambulatory referral/consult to Orthopedics          Aleve 2 po bid    Follow up with ORTHO

## 2020-06-09 ENCOUNTER — TELEPHONE (OUTPATIENT)
Dept: HEPATOLOGY | Facility: CLINIC | Age: 55
End: 2020-06-09

## 2020-06-15 ENCOUNTER — TELEPHONE (OUTPATIENT)
Dept: SURGERY | Facility: CLINIC | Age: 55
End: 2020-06-15

## 2020-06-15 NOTE — TELEPHONE ENCOUNTER
Called patient in reference to a referral to Colorectal Surgery for colon cancer screening. Patient verbally consented to a Colonoscopy and requested to be scheduled for a Colonoscopy on 07/17/2020. Patient was advised a designated  is required on the day of the Colonoscopy to drive the patient home and the  must be at least. 18 years old.Colonoscopy Prep instructions were thoroughly explained and discussed with the patient. Patient acknowledges understanding Prep instructions. Patient was advised the Colonoscopy Prep instructions discussed and explained on the phone , are being mailed out to the patient's address on file. Patient's address on file was verified with the patient for accuracy of mailing. Patient's medications on file was reviewed with the patient for accuracy of information. Patient denies taking  any other medications other than those listed and verified on medication profile.Patient was explained the Colonoscopy will be performed here at [facility]. Patient was further explained the Pre-Op will call one day prior to the procedure to discuss Pre-Op instructions and what time to report for the Colonoscopy. The patient was given the opportunity to ask any questions about the Colonoscopy. No further issues were discussed.

## 2020-06-15 NOTE — TELEPHONE ENCOUNTER
----- Message from Jenna Carballo sent at 6/15/2020  2:55 PM CDT -----  Regarding: appointment  Good afternoon,      Dr. Anaya would like to refer the following patient to [facility] for a colonoscopy. I have a scanned the following patient's referral and records into .     Can you please review and contact patient to schedule appointment. Also, Dr. Zimmer office can be reached at [phone].     Thank you,   Jennavirgil Geronimo

## 2020-06-15 NOTE — TELEPHONE ENCOUNTER
----- Message from Jenna Carballo sent at 6/15/2020  2:55 PM CDT -----  Regarding: appointment  Good afternoon,      Dr. Anaya would like to refer the following patient to [facility] for a colonoscopy. I have a scanned the following patient's referral and records into .     Can you please review and contact patient to schedule appointment. Also, Dr. Zimmer office can be reached at [phone].     Thank you,   Jennavirgil Geronimo

## 2020-06-15 NOTE — TELEPHONE ENCOUNTER
Called patient at all available numbers in reference to a referral to  Ambulatory Colorectal Surgery for colon cancer screening, appointment completed.

## 2020-06-16 DIAGNOSIS — Z12.11 SCREENING FOR COLON CANCER: Primary | ICD-10-CM

## 2020-06-16 RX ORDER — SODIUM CHLORIDE, SODIUM LACTATE, POTASSIUM CHLORIDE, CALCIUM CHLORIDE 600; 310; 30; 20 MG/100ML; MG/100ML; MG/100ML; MG/100ML
INJECTION, SOLUTION INTRAVENOUS CONTINUOUS
Status: CANCELLED | OUTPATIENT
Start: 2020-06-16

## 2020-06-16 RX ORDER — SODIUM CHLORIDE 0.9 % (FLUSH) 0.9 %
3 SYRINGE (ML) INJECTION
Status: CANCELLED | OUTPATIENT
Start: 2020-06-16

## 2020-06-22 RX ORDER — POLYETHYLENE GLYCOL 3350, SODIUM SULFATE ANHYDROUS, SODIUM BICARBONATE, SODIUM CHLORIDE, POTASSIUM CHLORIDE 236; 22.74; 6.74; 5.86; 2.97 G/4L; G/4L; G/4L; G/4L; G/4L
4 POWDER, FOR SOLUTION ORAL ONCE
Qty: 4000 ML | Refills: 0 | Status: SHIPPED | OUTPATIENT
Start: 2020-06-22 | End: 2020-06-22

## 2020-07-13 ENCOUNTER — TELEPHONE (OUTPATIENT)
Dept: SURGERY | Facility: CLINIC | Age: 55
End: 2020-07-13

## 2020-07-13 NOTE — TELEPHONE ENCOUNTER
Yefri returned a follow up call to patient related to upcoming Colonoscopy procedure. Patient acknowledges receiving Colonoscopy Prep instructions in the mail. Colonoscopy Prep instructions were summarized with the patient.Patient acknowledges understanding Prep instructions, including the last date and time to consume solid foods, to drink the entire prep as instructed, the last date and time to drink, not being on any blood thinners, the last date and time to stop taking aspirin or aspirin containing products, and a designated  is needed on the day of the procedure, who must be at least 18 years old.  The patient further acknowledge understanding, if you have not fasted as instructed in preparation and/or have not consumed the entire prep as ordered for the Colonoscopy, and not passing clear liquids only, there is a chance your procedure will not be able to be completed, and depending on your insurance you may be financially responsible for repeat procedure copayment. No further issues were discussed.

## 2021-08-12 ENCOUNTER — OFFICE VISIT (OUTPATIENT)
Dept: URGENT CARE | Facility: CLINIC | Age: 56
End: 2021-08-12
Payer: MEDICAID

## 2021-08-12 VITALS
WEIGHT: 228 LBS | SYSTOLIC BLOOD PRESSURE: 146 MMHG | HEIGHT: 70 IN | BODY MASS INDEX: 32.64 KG/M2 | OXYGEN SATURATION: 97 % | RESPIRATION RATE: 16 BRPM | TEMPERATURE: 98 F | DIASTOLIC BLOOD PRESSURE: 88 MMHG | HEART RATE: 62 BPM

## 2021-08-12 DIAGNOSIS — J02.9 SORE THROAT: Primary | ICD-10-CM

## 2021-08-12 LAB
CTP QC/QA: YES
CTP QC/QA: YES
MOLECULAR STREP A: NEGATIVE
SARS-COV-2 RDRP RESP QL NAA+PROBE: NEGATIVE

## 2021-08-12 PROCEDURE — 87651 POCT STREP A MOLECULAR: ICD-10-PCS | Mod: QW,S$GLB,, | Performed by: PHYSICIAN ASSISTANT

## 2021-08-12 PROCEDURE — 99214 PR OFFICE/OUTPT VISIT, EST, LEVL IV, 30-39 MIN: ICD-10-PCS | Mod: S$GLB,,, | Performed by: PHYSICIAN ASSISTANT

## 2021-08-12 PROCEDURE — U0002 COVID-19 LAB TEST NON-CDC: HCPCS | Mod: QW,S$GLB,, | Performed by: PHYSICIAN ASSISTANT

## 2021-08-12 PROCEDURE — 99214 OFFICE O/P EST MOD 30 MIN: CPT | Mod: S$GLB,,, | Performed by: PHYSICIAN ASSISTANT

## 2021-08-12 PROCEDURE — 87651 STREP A DNA AMP PROBE: CPT | Mod: QW,S$GLB,, | Performed by: PHYSICIAN ASSISTANT

## 2021-08-12 PROCEDURE — U0002: ICD-10-PCS | Mod: QW,S$GLB,, | Performed by: PHYSICIAN ASSISTANT
